# Patient Record
Sex: FEMALE | Race: WHITE | Employment: UNEMPLOYED | URBAN - NONMETROPOLITAN AREA
[De-identification: names, ages, dates, MRNs, and addresses within clinical notes are randomized per-mention and may not be internally consistent; named-entity substitution may affect disease eponyms.]

---

## 2019-08-07 ENCOUNTER — OFFICE VISIT (OUTPATIENT)
Dept: ORTHOPEDIC SURGERY | Age: 59
End: 2019-08-07
Payer: MEDICAID

## 2019-08-07 VITALS — HEIGHT: 63 IN | WEIGHT: 191 LBS | BODY MASS INDEX: 33.84 KG/M2

## 2019-08-07 DIAGNOSIS — M23.91 INTERNAL DERANGEMENT OF BOTH KNEES: Primary | ICD-10-CM

## 2019-08-07 DIAGNOSIS — M23.92 INTERNAL DERANGEMENT OF BOTH KNEES: Primary | ICD-10-CM

## 2019-08-07 DIAGNOSIS — M17.0 PRIMARY OSTEOARTHRITIS OF BOTH KNEES: ICD-10-CM

## 2019-08-07 PROCEDURE — G8427 DOCREV CUR MEDS BY ELIG CLIN: HCPCS | Performed by: NURSE PRACTITIONER

## 2019-08-07 PROCEDURE — 4004F PT TOBACCO SCREEN RCVD TLK: CPT | Performed by: NURSE PRACTITIONER

## 2019-08-07 PROCEDURE — G8417 CALC BMI ABV UP PARAM F/U: HCPCS | Performed by: NURSE PRACTITIONER

## 2019-08-07 PROCEDURE — 3017F COLORECTAL CA SCREEN DOC REV: CPT | Performed by: NURSE PRACTITIONER

## 2019-08-07 PROCEDURE — 99203 OFFICE O/P NEW LOW 30 MIN: CPT | Performed by: NURSE PRACTITIONER

## 2019-08-07 RX ORDER — CLONIDINE HYDROCHLORIDE 0.1 MG/1
0.1 TABLET ORAL
COMMUNITY
Start: 2019-08-05

## 2019-08-07 NOTE — LETTER
motion  -there is not an effusion  - there is tenderness over the  medial, lateral region  -there are not any masses  - there is not ligamentous instability  -there is not  deformity noted. - tenderness laxity is not noted with varus and valgus stress.   -[place] testing pain to lateral aspect  -Anterior/posterior drawer testing negative    left knee exam shows;    -range of motion of L. Knee is 0 to 120. The patient does have  pain on motion  -there is not an effusion  - there is tenderness over the  medial region  -there are not any masses  - there is not ligamentous instability  -there is not  deformity noted. - tenderness laxity is not noted with varus or valgus stress.   -[place]rays testing pain to medial aspect  -Anterior/posterior drawer testing negative    Contralateral Exam:  -No obvious deformities  -No abrasions or cellulitis noted, NVI   -Full ROM   -No joint laxity  -no palpable tenderness noted    Neurological exam:   -Deep tendon reflexes are 2/4 at the knees and 2/4 at the ankles with strong extensor hallicus longus motor strength bilaterally. --Distal pulses DP/PT: R. 2+; L. 2+.     Skin exam:  There is not any cellulitis, lymphedema or cutaneous lesions noted in the lower extremities.     Xray: 3 view of right knee obtained on 7/19/2019 and reviewed today shows: No acute fractures or deformities; lateral compartment well-maintained; medial compartment difficult to view as well as I would like; degenerative joint space narrowing of the patellofemoral joint  5 view of left knee obtained today shows: No acute fractures or deformity; lateral compartment well-maintained, mild joint space narrowing of the medial compartment and patellofemoral joint    Assessment: Bilateral knee internal derangement; bilateral knee osteoarthritis    Plan:  I discussed treatment options with patient including cortisone injections, anti-inflammatories, therapy and rest.  Patient prefers to avoid any

## 2019-09-25 ENCOUNTER — OFFICE VISIT (OUTPATIENT)
Dept: ORTHOPEDIC SURGERY | Age: 59
End: 2019-09-25
Payer: MEDICAID

## 2019-09-25 VITALS
DIASTOLIC BLOOD PRESSURE: 76 MMHG | SYSTOLIC BLOOD PRESSURE: 141 MMHG | HEART RATE: 99 BPM | BODY MASS INDEX: 31.89 KG/M2 | HEIGHT: 63 IN | WEIGHT: 180 LBS

## 2019-09-25 DIAGNOSIS — M17.11 PRIMARY OSTEOARTHRITIS OF RIGHT KNEE: Primary | ICD-10-CM

## 2019-09-25 DIAGNOSIS — M23.91 INTERNAL DERANGEMENT OF RIGHT KNEE: ICD-10-CM

## 2019-09-25 PROCEDURE — 20610 DRAIN/INJ JOINT/BURSA W/O US: CPT | Performed by: NURSE PRACTITIONER

## 2019-09-25 RX ORDER — AMOXICILLIN 250 MG/1
250 CAPSULE ORAL 3 TIMES DAILY
COMMUNITY
End: 2020-05-13 | Stop reason: ALTCHOICE

## 2019-09-25 NOTE — LETTER
[facility] [place] [address]  Phone: [phone]  Fax: [phone]    KEERTHI Cardenas CNP        September 25, 2019    [facility]  [address]  [facility] [address]    Patient presents today for a right knee cortisone injection. Patient had requested one previously but stated she did not have insulin at home. The patient now confirms she does have insulin if needed for an increase in blood sugar after the cortisone injection. Recommendation is for a cortisone injection into the right knee. After informed consent was received from the patient, the right knee was injected with 1 mL( 40mg) Depo-Medrol and 4 mL  of 0.25% Marcaine in the syringe from an anterolateral joint line approach, using a 25-gauge needle, under sterile Betadine prep, using ethyl chloride as a topical refrigerant, for a diagnosis of osteoarthritis. The patient appeared to tolerate it well. The patient should return here periodically as needed.     Site: RIGHT KNEE    BUPIVACAINE  Lot number: [id number]  NDC#  49443-008-06      DEPO  NDC# 6721-6282-00  Lot number: [id number]    Product:  BUPIVACAINE/DEPO    All medications were supplied by the facility            Sincerely,  '  KEERTHI Cardenas CNP

## 2020-05-13 ENCOUNTER — VIRTUAL VISIT (OUTPATIENT)
Dept: ENDOCRINOLOGY | Age: 60
End: 2020-05-13
Payer: MEDICAID

## 2020-05-13 PROCEDURE — 99243 OFF/OP CNSLTJ NEW/EST LOW 30: CPT | Performed by: INTERNAL MEDICINE

## 2020-05-13 PROCEDURE — 2022F DILAT RTA XM EVC RTNOPTHY: CPT | Performed by: INTERNAL MEDICINE

## 2020-05-13 PROCEDURE — G8427 DOCREV CUR MEDS BY ELIG CLIN: HCPCS | Performed by: INTERNAL MEDICINE

## 2020-05-13 RX ORDER — FLASH GLUCOSE SENSOR
KIT MISCELLANEOUS
Qty: 2 EACH | Refills: 3 | Status: SHIPPED | OUTPATIENT
Start: 2020-05-13 | End: 2021-06-17 | Stop reason: SDUPTHER

## 2020-05-13 RX ORDER — OXYBUTYNIN CHLORIDE 10 MG/1
10 TABLET, EXTENDED RELEASE ORAL DAILY
COMMUNITY

## 2020-05-13 RX ORDER — HYDROCODONE BITARTRATE AND ACETAMINOPHEN 5; 325 MG/1; MG/1
1 TABLET ORAL 2 TIMES DAILY
COMMUNITY
Start: 2013-12-17

## 2020-05-13 RX ORDER — FLASH GLUCOSE SCANNING READER
EACH MISCELLANEOUS
Qty: 1 DEVICE | Refills: 0 | Status: SHIPPED | OUTPATIENT
Start: 2020-05-13 | End: 2021-06-17 | Stop reason: SDUPTHER

## 2020-05-13 RX ORDER — FLUTICASONE PROPIONATE 110 UG/1
1 AEROSOL, METERED RESPIRATORY (INHALATION) 2 TIMES DAILY
COMMUNITY

## 2020-05-13 RX ORDER — INSULIN ASPART 100 [IU]/ML
3-4 INJECTION, SOLUTION INTRAVENOUS; SUBCUTANEOUS
Qty: 5 PEN | Refills: 3 | Status: SHIPPED | OUTPATIENT
Start: 2020-05-13 | End: 2021-06-17 | Stop reason: SDUPTHER

## 2020-05-13 ASSESSMENT — ENCOUNTER SYMPTOMS
VOMITING: 0
BACK PAIN: 0
ABDOMINAL PAIN: 0
COUGH: 0
SHORTNESS OF BREATH: 0
NAUSEA: 0
SORE THROAT: 0

## 2020-05-13 NOTE — PROGRESS NOTES
 pravastatin (PRAVACHOL) 40 MG tablet Take 40 mg by mouth daily.  ranolazine (RANEXA) 1000 MG TB12 Take  by mouth. There were no vitals filed for this visit. There is no height or weight on file to calculate BMI. Wt Readings from Last 3 Encounters:   19 180 lb (81.6 kg)   19 191 lb (86.6 kg)   08/27/15 179 lb (81.2 kg)     BP Readings from Last 3 Encounters:   19 (!) 141/76   08/27/15 118/70   14 136/78        Past Medical History:   Diagnosis Date    Asthma     CAD (coronary artery disease)     Diabetes mellitus ([facility]     Postmenopausal symptoms      Past Surgical History:   Procedure Laterality Date     SECTION      DILATION AND CURETTAGE OF UTERUS      TONSILLECTOMY       History reviewed. No pertinent family history. Social History     Tobacco Use   Smoking Status Current Every Day Smoker    Packs/day: 0.30    Years: 20.00    Pack years: 6.00    Types: Cigarettes   Smokeless Tobacco Never Used      Social History     Substance and Sexual Activity   Alcohol Use Yes       HPI      Clive Colby is a 64 y.o. female who  was seen in a virtual visit for initial evaluation of  DM. Patient is being seen at the request of Velma Li MD      Due to the COVID-19 restrictions on close contact interactions the patient's visit was conducted via video link  ( doxy. me) in lieu of a face to face visit. Location for patient : home  Physician : home    Pursuant to the emergency declaration under the [address] [place] [address] and the [employer] Act, this Virtual  Visit was conducted, with patient's consent, to reduce the patient's risk of exposure to COVID-19 and provide necessary care. Because this was a Virtual Visit, evaluation of the following organ systems was limited: Vitals, Constitutional, EENT, Resp, CV, GI, , MS, Neuro, Skin. Heme. Lymph, Imm. Patient has a PMH of Type 2 DM, hypertension, hyperlipidemia, CKD    Diagnosed with Diabetes Mellitus type 2 > 20 yrs    Course has been variable . Microvascular complications: No known retinopathy Last eye exam 02/20. Has Nephropathy. Has Peripheral neuropathy. Home regimen:   Novolin 70/30 on a sliding scale twice a day. Previous medications: Metformin     Blood glucose trend  Checks BS 4 times a day    -200      Diet: Eats  meals/day     Nutrition education: No   Exercise: No      Hyperlipidemia:  Pravastatin 40 mg daily. Hypothyroidism : Was diagnosed with hypothyroidism in 1768-5960. On levothyroxine 100 mcg daily. Labs    Reviewed from [place] [address]: Negative for sore throat. Respiratory: Negative for cough and shortness of breath. Cardiovascular: Negative for chest pain and palpitations. Gastrointestinal: Negative for abdominal pain, nausea and vomiting. Endocrine: Negative for polydipsia. Genitourinary: Negative for frequency and urgency. Musculoskeletal: Negative for back pain and myalgias. Skin: Negative for rash. Neurological: Negative for headaches. Psychiatric/Behavioral: The patient is not nervous/anxious. Brief exam on video visit  Patient alert,  Awake, oriented. Normal speech  No distress noted  Normal eyes   Normal hearing  Normal  hand movements. Assessment/Plan      1. Type 2 DM     Aliyah Henriquez is a 64 y.o. female has Type 2 DM with obesity and insulin resistance. B2W 5.4 %     Complicated by nephropathy, neuropathy. She has made in her changes. Requiring very few units of insulin. Advised lifestyle modification including carb restricted diet . Refer to lidia for a diabetic meal plan at next visit. Advised follow-up with the ophthalmologist once year. Discussed foot care. 2. Hypertension. As per nephrology . 3. Hyperlipidemia. On statins.

## 2020-05-14 ENCOUNTER — TELEPHONE (OUTPATIENT)
Dept: ENDOCRINOLOGY | Age: 60
End: 2020-05-14

## 2020-05-14 NOTE — TELEPHONE ENCOUNTER
----- Message from Slime Harper MD sent at 5/13/2020  1:55 PM EDT -----  Please send a copy of my note to patients referring physician Carla Del Castillo MD  and schedule a follow-up in 3 month at [facility].

## 2020-05-28 ENCOUNTER — TELEPHONE (OUTPATIENT)
Dept: ENDOCRINOLOGY | Age: 60
End: 2020-05-28

## 2020-05-28 NOTE — TELEPHONE ENCOUNTER
I have only seen the patient once and she was already using a sliding scale . She can take 2 units if sugars above 150 and 4 units if above 200  No insulin if sugar below 150.

## 2020-05-28 NOTE — TELEPHONE ENCOUNTER
Pt would like someone to call her with directions for her insuline pen, she is needing to how much to take

## 2020-10-12 ENCOUNTER — VIRTUAL VISIT (OUTPATIENT)
Dept: ENDOCRINOLOGY | Age: 60
End: 2020-10-12
Payer: MEDICAID

## 2020-10-12 PROCEDURE — 3046F HEMOGLOBIN A1C LEVEL >9.0%: CPT | Performed by: INTERNAL MEDICINE

## 2020-10-12 PROCEDURE — 99214 OFFICE O/P EST MOD 30 MIN: CPT | Performed by: INTERNAL MEDICINE

## 2020-10-12 PROCEDURE — 2022F DILAT RTA XM EVC RTNOPTHY: CPT | Performed by: INTERNAL MEDICINE

## 2020-10-12 PROCEDURE — 3017F COLORECTAL CA SCREEN DOC REV: CPT | Performed by: INTERNAL MEDICINE

## 2020-10-12 PROCEDURE — G8427 DOCREV CUR MEDS BY ELIG CLIN: HCPCS | Performed by: INTERNAL MEDICINE

## 2020-10-12 ASSESSMENT — ENCOUNTER SYMPTOMS
COUGH: 0
VOMITING: 0
BACK PAIN: 0
SHORTNESS OF BREATH: 0
ABDOMINAL PAIN: 0
SORE THROAT: 0
NAUSEA: 0

## 2020-10-12 NOTE — PROGRESS NOTES
Endocrinology  Braulio Smith M.D. Phone: [phone]   FAX: [phone]       Loletha Babinski   YOB: 1960    Date of Visit:  10/12/2020    Allergies   Allergen Reactions    Ace Inhibitors     Bee Venom     Milk-Related Compounds      Outpatient Medications Marked as Taking for the 10/12/20 encounter (Virtual Visit) with Sugar Hernandez MD   Medication Sig Dispense Refill    HYDROcodone-acetaminophen (NORCO) 5-325 MG per tablet Take 1 tablet by mouth 2 times daily.  fluticasone (FLOVENT HFA) 110 MCG/ACT inhaler Inhale 1 puff into the lungs 2 times daily      oxybutynin (DITROPAN-XL) 10 MG extended release tablet Take 10 mg by mouth daily      insulin aspart (NOVOLOG FLEXPEN) 100 UNIT/ML injection pen Inject 3-4 Units into the skin 3 times daily (before meals) 5 pen 3    Insulin Pen Needle 32G X 4 MM MISC 1 each by Does not apply route 3 times daily 100 each 3    Continuous Blood Gluc  (FREESTYLE SARATH 14 DAY READER) ARCADIO Use to monitor sugars 1 Device 0    Continuous Blood Gluc Sensor (FREESTYLE SARATH 14 DAY SENSOR) MISC Use to monitor sugars 2 each 3    cloNIDine (CATAPRES) 0.1 MG tablet Take 0.1 mg by mouth      estrogens, conjugated,-methyltestosterone (EST ESTROGENS-METHYLTEST HS) 0.625-1.25 MG per tablet TAKE ONE TABLET BY MOUTH ONCE DAILY 30 tablet 11    FEMHRT 1/5 1-5 MG-MCG TABS Take 1 tablet by mouth daily. 30 tablet 11    potassium chloride (KLOR-CON) 10 MEQ CR tablet Take 10 mEq by mouth as needed.  albuterol (PROVENTIL) (2.5 MG/3ML) 0.083% nebulizer solution Take 2.5 mg by nebulization as needed.  levothyroxine (SYNTHROID) 100 MCG tablet Take 100 mcg by mouth daily.  amlodipine (NORVASC) 10 MG tablet Take 10 mg by mouth daily.  theophylline (RICKY-24) 400 MG CP24 Take  by mouth 2 times daily.  paroxetine (PAXIL) 30 MG tablet Take 60 mg by mouth every morning.       alprazolam (XANAX) 1 MG tablet Take 1 mg by mouth 4 times daily.  valsartan-hydrochlorothiazide (DIOVAN-HCT) 160-25 MG per tablet Take 1 Tab by mouth daily.  omeprazole (PRILOSEC) 40 MG capsule Take 40 mg by mouth daily.  pravastatin (PRAVACHOL) 40 MG tablet Take 40 mg by mouth daily.  ranolazine (RANEXA) 1000 MG TB12 Take  by mouth. There were no vitals filed for this visit. There is no height or weight on file to calculate BMI. Wt Readings from Last 3 Encounters:   19 180 lb (81.6 kg)   19 191 lb (86.6 kg)   08/27/15 179 lb (81.2 kg)     BP Readings from Last 3 Encounters:   19 (!) 141/76   08/27/15 118/70   14 136/78        Past Medical History:   Diagnosis Date    Asthma     CAD (coronary artery disease)     Diabetes mellitus ([address]     Postmenopausal symptoms      Past Surgical History:   Procedure Laterality Date     SECTION      DILATION AND CURETTAGE OF UTERUS      TONSILLECTOMY       History reviewed. No pertinent family history. Social History     Tobacco Use   Smoking Status Current Every Day Smoker    Packs/day: 0.30    Years: 20.00    Pack years: 6.00    Types: Cigarettes   Smokeless Tobacco Never Used      Social History     Substance and Sexual Activity   Alcohol Use Yes       HPI      Felicitas Coughlin is a 64 y.o. female who  was seen in a virtual visit for management of  DM. PCP  Anthony Abraham MD      Due to the COVID-19 restrictions on close contact interactions the patient's visit was conducted via video link  ( doxy. me) in lieu of a face to face visit. Location for patient : home  Physician : home    Pursuant to the emergency declaration under the [facility], [address] [place] [address] authority and the [employer] Act, this Virtual  Visit was conducted, with patient's consent, to reduce the patient's risk of exposure to COVID-19 and provide necessary care.       Because this was a Virtual Visit, evaluation of the following organ systems was limited: Vitals, Constitutional, EENT, Resp, CV, GI, , MS, Neuro, Skin. Heme. Lymph, Imm. Patient has a PMH of Type 2 DM, hypertension, hyperlipidemia, CKD    Diagnosed with Diabetes Mellitus type 2 > 20 yrs    Course has been variable . Microvascular complications: No known retinopathy Last eye exam 02/20. Has Nephropathy. Has Peripheral neuropathy. Home regimen:   Novolog on a sliding scale twice a day. -200 2 units   201-250 4 units  > 250  6 units    Previous medications: Metformin     Blood glucose trend  Checks BS 4 times a day    -150      Diet: Eats  meals/day     Nutrition education: No   Exercise: No      Hyperlipidemia:  She has mixed hyperlipidemia  O nPravastatin 40 mg daily. TOLERATING WELL. No muscle aches or pain    Hypothyroidism : Was diagnosed with hypothyroidism in 3174-3312. On levothyroxine 100 mcg daily. Labs    Reviewed from [place] [address]: Negative for sore throat. Respiratory: Negative for cough and shortness of breath. Cardiovascular: Negative for chest pain and palpitations. Gastrointestinal: Negative for abdominal pain, nausea and vomiting. Endocrine: Negative for polydipsia. Genitourinary: Negative for frequency and urgency. Musculoskeletal: Negative for back pain and myalgias. Skin: Negative for rash. Neurological: Negative for headaches. Psychiatric/Behavioral: The patient is not nervous/anxious. Brief exam on video visit  Patient alert,  Awake, oriented. Normal speech  No distress noted  Normal eyes   Normal hearing  Normal  hand movements. Assessment/Plan      1. Type 2 DM     Aliyah Traore is a 64 y.o. female has Type 2 DM with obesity and insulin resistance. S5N 5.4 %     Complicated by nephropathy, neuropathy.      She has made in her diet  Insulin requirements have decreased     Continue novolog SSI

## 2021-01-06 NOTE — TELEPHONE ENCOUNTER
Medication:   Requested Prescriptions     Pending Prescriptions Disp Refills    Insulin Pen Needle 32G X 4 MM MISC 100 each 3     Si each by Does not apply route 3 times daily       Last appt: 2020   Next appt: 2021    Last Labs DM: No results found for: LABA1C

## 2021-03-11 ENCOUNTER — VIRTUAL VISIT (OUTPATIENT)
Dept: ENDOCRINOLOGY | Age: 61
End: 2021-03-11
Payer: MEDICAID

## 2021-03-11 DIAGNOSIS — E88.81 INSULIN RESISTANCE: ICD-10-CM

## 2021-03-11 DIAGNOSIS — E66.9 CLASS 1 OBESITY WITH SERIOUS COMORBIDITY AND BODY MASS INDEX (BMI) OF 31.0 TO 31.9 IN ADULT, UNSPECIFIED OBESITY TYPE: ICD-10-CM

## 2021-03-11 PROCEDURE — 4004F PT TOBACCO SCREEN RCVD TLK: CPT | Performed by: NURSE PRACTITIONER

## 2021-03-11 PROCEDURE — G8428 CUR MEDS NOT DOCUMENT: HCPCS | Performed by: NURSE PRACTITIONER

## 2021-03-11 PROCEDURE — G8484 FLU IMMUNIZE NO ADMIN: HCPCS | Performed by: NURSE PRACTITIONER

## 2021-03-11 PROCEDURE — 99213 OFFICE O/P EST LOW 20 MIN: CPT | Performed by: NURSE PRACTITIONER

## 2021-03-11 PROCEDURE — G8421 BMI NOT CALCULATED: HCPCS | Performed by: NURSE PRACTITIONER

## 2021-03-11 PROCEDURE — 3017F COLORECTAL CA SCREEN DOC REV: CPT | Performed by: NURSE PRACTITIONER

## 2021-03-11 ASSESSMENT — ENCOUNTER SYMPTOMS
SORE THROAT: 0
COUGH: 0
NAUSEA: 0
VOMITING: 0
SHORTNESS OF BREATH: 0
ABDOMINAL PAIN: 0
BACK PAIN: 0

## 2021-03-11 NOTE — PROGRESS NOTES
Endocrinology  Rohan Delaney CNP, [address] [facility], [address]  Phone [phone]  Fax [phone]    Luis Enrique Russo is  being evaluated by a Virtual Visit (video visit) encounter to address concerns as mentioned above. Due to this being a TeleHealth encounter (During DPHEQ-67 public health emergency), evaluation of the following organ systems was limited: Vitals/Constitutional/EENT/Resp/CV/GI//MS/Neuro/Skin/Heme-Lymph-Imm. Pursuant to the emergency declaration under the [address] [place] [address], [address] [place] [address] authority and the [employer] Act, this Virtual Visit was conducted with patient's (and/or legal guardian's) consent, to reduce the patient's risk of exposure to COVID-19 and provide necessary medical care. The patient (and/or legal guardian) has also been advised to contact this office for worsening conditions or problems, and seek emergency medical treatment and/or call 911 if deemed necessary. Services were provided through a video synchronous discussion virtually to substitute for in-person clinic visit. Patient and provider were located at their individual homes    Patient was identified via name,  on the video visit    Luis Enrique Russo is a 63 y.o. female who  was seen in a virtual visit for management of  DM. PCP  Wesley Corado MD        Allergies   Allergen Reactions    Latex      Other reaction(s): Other (See Comments)    Ace Inhibitors     Bee Venom     Levofloxacin      Other reaction(s):  Other (See Comments)    Methylprednisolone Other (See Comments)    Milk-Related Compounds     Prednisone Other (See Comments)     Outpatient Medications Marked as Taking for the 3/11/21 encounter (Virtual Visit) with KEERTHI Pennington CNP   Medication Sig Dispense Refill    Insulin Pen Needle 32G X 4 MM MISC USE WITH INSULIN 3 TIMES DAILY 100 each 3    HYDROcodone-acetaminophen (NORCO) 5-325 MG per tablet Take 1 tablet by mouth 2 times daily.  fluticasone (FLOVENT HFA) 110 MCG/ACT inhaler Inhale 1 puff into the lungs 2 times daily      oxybutynin (DITROPAN-XL) 10 MG extended release tablet Take 10 mg by mouth daily      insulin aspart (NOVOLOG FLEXPEN) 100 UNIT/ML injection pen Inject 3-4 Units into the skin 3 times daily (before meals) 5 pen 3    Continuous Blood Gluc  (FREESTYLE SARATH 14 DAY READER) ARCADIO Use to monitor sugars 1 Device 0    Continuous Blood Gluc Sensor (FREESTYLE SARATH 14 DAY SENSOR) [facility] to [facility] sugars 2 each 3    cloNIDine (CATAPRES) 0.1 MG tablet Take 0.1 mg by mouth      estrogens, conjugated,-methyltestosterone (EST ESTROGENS-METHYLTEST HS) 0.625-1.25 MG per tablet TAKE ONE TABLET BY MOUTH ONCE DAILY 30 tablet 11    FEMHRT 1/5 1-5 MG-MCG TABS Take 1 tablet by mouth daily. 30 tablet 11    potassium chloride (KLOR-CON) 10 MEQ CR tablet Take 10 mEq by mouth as needed.  albuterol (PROVENTIL) (2.5 MG/3ML) 0.083% nebulizer solution Take 2.5 mg by nebulization as needed.  levothyroxine (SYNTHROID) 100 MCG tablet Take 100 mcg by mouth daily.  amlodipine (NORVASC) 10 MG tablet Take 10 mg by mouth daily.  theophylline (RICKY-24) 400 MG CP24 Take  by mouth 2 times daily.  paroxetine (PAXIL) 30 MG tablet Take 60 mg by mouth every morning.  alprazolam (XANAX) 1 MG tablet Take 1 mg by mouth 4 times daily.  valsartan-hydrochlorothiazide (DIOVAN-HCT) 160-25 MG per tablet Take 1 Tab by mouth daily.  omeprazole (PRILOSEC) 40 MG capsule Take 40 mg by mouth daily.  pravastatin (PRAVACHOL) 40 MG tablet Take 40 mg by mouth daily.  ranolazine (RANEXA) 1000 MG TB12 Take  by mouth. There were no vitals filed for this visit. There is no height or weight on file to calculate BMI.      Wt Readings from Last 3 Encounters:   09/25/19 180 lb (81.6 kg)   08/07/19 191 lb (86.6 kg) spent directly counseling patient about topics listed above (such as lifestyle modifications, preventative screenings and/or disease related processes. Return in about 6 months (around 9/11/2021).

## 2021-03-23 PROBLEM — E66.9 OBESITY: Status: ACTIVE | Noted: 2021-03-23

## 2021-03-23 PROBLEM — E88.819 INSULIN RESISTANCE: Status: ACTIVE | Noted: 2021-03-23

## 2021-03-23 PROBLEM — E88.81 INSULIN RESISTANCE: Status: ACTIVE | Noted: 2021-03-23

## 2021-06-17 ENCOUNTER — TELEPHONE (OUTPATIENT)
Dept: ENDOCRINOLOGY | Age: 61
End: 2021-06-17

## 2021-06-17 ENCOUNTER — VIRTUAL VISIT (OUTPATIENT)
Dept: ENDOCRINOLOGY | Age: 61
End: 2021-06-17
Payer: MEDICAID

## 2021-06-17 DIAGNOSIS — Z79.4 TYPE 2 DIABETES MELLITUS WITHOUT COMPLICATION, WITH LONG-TERM CURRENT USE OF INSULIN (HCC): Primary | ICD-10-CM

## 2021-06-17 DIAGNOSIS — E11.9 TYPE 2 DIABETES MELLITUS WITHOUT COMPLICATION, WITH LONG-TERM CURRENT USE OF INSULIN (HCC): Primary | ICD-10-CM

## 2021-06-17 DIAGNOSIS — E66.9 CLASS 1 OBESITY WITH SERIOUS COMORBIDITY AND BODY MASS INDEX (BMI) OF 31.0 TO 31.9 IN ADULT, UNSPECIFIED OBESITY TYPE: ICD-10-CM

## 2021-06-17 PROCEDURE — 99213 OFFICE O/P EST LOW 20 MIN: CPT | Performed by: NURSE PRACTITIONER

## 2021-06-17 PROCEDURE — G8421 BMI NOT CALCULATED: HCPCS | Performed by: NURSE PRACTITIONER

## 2021-06-17 PROCEDURE — 4004F PT TOBACCO SCREEN RCVD TLK: CPT | Performed by: NURSE PRACTITIONER

## 2021-06-17 PROCEDURE — 3046F HEMOGLOBIN A1C LEVEL >9.0%: CPT | Performed by: NURSE PRACTITIONER

## 2021-06-17 PROCEDURE — G8427 DOCREV CUR MEDS BY ELIG CLIN: HCPCS | Performed by: NURSE PRACTITIONER

## 2021-06-17 PROCEDURE — 3017F COLORECTAL CA SCREEN DOC REV: CPT | Performed by: NURSE PRACTITIONER

## 2021-06-17 PROCEDURE — 2022F DILAT RTA XM EVC RTNOPTHY: CPT | Performed by: NURSE PRACTITIONER

## 2021-06-17 RX ORDER — THEOPHYLLINE ANHYDROUS 200 MG/1
CAPSULE, EXTENDED RELEASE ORAL
COMMUNITY
Start: 2021-05-06

## 2021-06-17 RX ORDER — LOSARTAN POTASSIUM 25 MG/1
TABLET ORAL
COMMUNITY
Start: 2021-06-16

## 2021-06-17 RX ORDER — FLASH GLUCOSE SCANNING READER
EACH MISCELLANEOUS
Qty: 1 DEVICE | Refills: 0 | Status: SHIPPED | OUTPATIENT
Start: 2021-06-17 | End: 2021-06-21 | Stop reason: SDUPTHER

## 2021-06-17 RX ORDER — ISOSORBIDE MONONITRATE 30 MG/1
TABLET, EXTENDED RELEASE ORAL
COMMUNITY
Start: 2021-05-26

## 2021-06-17 RX ORDER — SIMVASTATIN 40 MG
TABLET ORAL
COMMUNITY
Start: 2021-05-06

## 2021-06-17 RX ORDER — INSULIN ASPART 100 [IU]/ML
3-4 INJECTION, SOLUTION INTRAVENOUS; SUBCUTANEOUS
Qty: 5 PEN | Refills: 3 | Status: SHIPPED | OUTPATIENT
Start: 2021-06-17 | End: 2022-03-11 | Stop reason: SDUPTHER

## 2021-06-17 RX ORDER — FLASH GLUCOSE SENSOR
KIT MISCELLANEOUS
Qty: 2 EACH | Refills: 3 | Status: SHIPPED | OUTPATIENT
Start: 2021-06-17 | End: 2021-06-21 | Stop reason: SDUPTHER

## 2021-06-17 RX ORDER — GLUCOSAMINE HCL/CHONDROITIN SU 500-400 MG
1 CAPSULE ORAL
Qty: 150 EACH | Refills: 5 | Status: SHIPPED | OUTPATIENT
Start: 2021-06-17

## 2021-06-17 RX ORDER — ALCOHOL ANTISEPTIC PADS
PADS, MEDICATED (EA) TOPICAL
COMMUNITY
Start: 2021-05-06

## 2021-06-17 ASSESSMENT — ENCOUNTER SYMPTOMS
ABDOMINAL PAIN: 0
BACK PAIN: 0
VOMITING: 0
NAUSEA: 0
COUGH: 0
SORE THROAT: 0
SHORTNESS OF BREATH: 0

## 2021-06-17 NOTE — PROGRESS NOTES
Endocrinology  Thedford Dubin, CNP, [address] [facility] [address]   [address] [facility], [address]  Phone [phone]  Fax [phone]    Efrain Jenkins is  being evaluated by a Virtual Visit (phone visit) encounter to address concerns as mentioned above. Due to this being a TeleHealth encounter (During UPAIL-14 public health emergency), evaluation of the following organ systems was limited: Vitals/Constitutional/EENT/Resp/CV/GI//MS/Neuro/Skin/Heme-Lymph-Imm. Pursuant to the emergency declaration under the [facility], [address] [place] [address] authority and the [employer] Act, this Virtual Visit was conducted with patient's (and/or legal guardian's) consent, to reduce the patient's risk of exposure to COVID-19 and provide necessary medical care. The patient (and/or legal guardian) has also been advised to contact this office for worsening conditions or problems, and seek emergency medical treatment and/or call 911 if deemed necessary. Services were provided through a phone synchronous discussion virtually to substitute for in-person clinic visit. Patient and provider were located at their individual homes    Patient was identified via name,  on the phone visit      Efrain Jenkins is a 63 y.o. female who  was seen in a virtual visit for management of  DM. PCP  Femrin Corado MD        Allergies   Allergen Reactions    Latex      Other reaction(s): Other (See Comments)    Ace Inhibitors     Bee Venom     Levofloxacin      Other reaction(s):  Other (See Comments)    Methylprednisolone Other (See Comments)    Milk-Related Compounds     Prednisone Other (See Comments)     Outpatient Medications Marked as Taking for the 21 encounter (Virtual Visit) with Thedford Dubin, APRN - CNP   Medication Sig Dispense Refill    isosorbide mononitrate (IMDUR) 30 MG extended release tablet TAKE ONE TABLET BY MOUTH EVERY MORNING      losartan (COZAAR) 25 MG tablet       simvastatin (ZOCOR) 40 MG tablet TAKE ONE TABLET BY MOUTH EVERY DAY      RICKY-24 200 MG extended release capsule TAKE ONE CAPSULE BY MOUTH TWO TIMES A DAY      UltiCare Alcohol Swabs 70 % PADS USE TWO TIMES A DAY TO TEST BLOOD SUGAR.  Continuous Blood Gluc  (FREESTYLE SARATH 14 DAY READER) ARCADIO Use to monitor sugars 1 Device 0    Continuous Blood Gluc Sensor (FREESTYLE SARATH 14 DAY SENSOR) MISC Use to monitor sugars 2 each 3    Insulin Pen Needle 32G X 4 MM MISC USE WITH INSULIN 3 TIMES DAILY 100 each 3    insulin aspart (NOVOLOG FLEXPEN) 100 UNIT/ML injection pen Inject 3-4 Units into the skin 3 times daily (before meals) 5 pen 3    Alcohol Swabs 70 % PADS 1 Units by Does not apply route 5 times daily 150 each 5    HYDROcodone-acetaminophen (NORCO) 5-325 MG per tablet Take 1 tablet by mouth 2 times daily.  fluticasone (FLOVENT HFA) 110 MCG/ACT inhaler Inhale 1 puff into the lungs 2 times daily      oxybutynin (DITROPAN-XL) 10 MG extended release tablet Take 10 mg by mouth daily      cloNIDine (CATAPRES) 0.1 MG tablet Take 0.1 mg by mouth      estrogens, conjugated,-methyltestosterone (EST ESTROGENS-METHYLTEST HS) 0.625-1.25 MG per tablet TAKE ONE TABLET BY MOUTH ONCE DAILY 30 tablet 11    FEMHRT 1/5 1-5 MG-MCG TABS Take 1 tablet by mouth daily. 30 tablet 11    potassium chloride (KLOR-CON) 10 MEQ CR tablet Take 10 mEq by mouth as needed.  albuterol (PROVENTIL) (2.5 MG/3ML) 0.083% nebulizer solution Take 2.5 mg by nebulization as needed.  levothyroxine (SYNTHROID) 100 MCG tablet Take 100 mcg by mouth daily.  amlodipine (NORVASC) 10 MG tablet Take 10 mg by mouth daily.  theophylline (RICKY-24) 400 MG CP24 Take  by mouth 2 times daily.  paroxetine (PAXIL) 30 MG tablet Take 60 mg by mouth every morning.  alprazolam (XANAX) 1 MG tablet Take 1 mg by mouth 4 times daily.       valsartan-hydrochlorothiazide (DIOVAN-HCT) 160-25 MG per tablet Take 1 Tab by mouth daily.  omeprazole (PRILOSEC) 40 MG capsule Take 40 mg by mouth daily.  pravastatin (PRAVACHOL) 40 MG tablet Take 40 mg by mouth daily.  ranolazine (RANEXA) 1000 MG TB12 Take  by mouth. There were no vitals filed for this visit. There is no height or weight on file to calculate BMI. Wt Readings from Last 3 Encounters:   19 180 lb (81.6 kg)   19 191 lb (86.6 kg)   08/27/15 179 lb (81.2 kg)     BP Readings from Last 3 Encounters:   19 (!) 141/76   08/27/15 118/70   14 136/78        Past Medical History:   Diagnosis Date    Asthma     CAD (coronary artery disease)     Diabetes mellitus ([facility] [address]     Postmenopausal symptoms      Past Surgical History:   Procedure Laterality Date     SECTION      DILATION AND CURETTAGE OF UTERUS      TONSILLECTOMY       History reviewed. No pertinent family history. Social History     Tobacco Use   Smoking Status Current Every Day Smoker    Packs/day: 0.30    Years: 20.00    Pack years: 6.00    Types: Cigarettes   Smokeless Tobacco Never Used      Social History     Substance and Sexual Activity   Alcohol Use Yes         Diagnosed with Diabetes Mellitus type 2 > 20 yrs  Course has been variable . On insulin? > 5 years  Microvascular complications:   · No known retinopathy Last eye exam . · Has Nephropathy. · Has Peripheral neuropathy. Macrovascular history : none    Blood glucose trend  Checks BS 4 times a day  BS FB-150  Prandial: 135 -170    Home medication regimen:     Novolog on a sliding scale twice a day. BS    151-200  2 units   201-250  4 units  > 250    6 units     Previous medications: Metformin     Diabetic dietary regimen  Diet: Eats  2-3 meals/day   Carb counting: none  Nutrition education: No   Exercise: No    Hyperlipidemia:    OnPravastatin 40 mg daily.    Tolerates well, no muscle aches or pain      Hypothyroidism :   Was diagnosed with hypothyroidism in 5370-8346. On levothyroxine 100 mcg daily. No results found for: TSH, T3LOOWO, P0NHHDO, THYROIDAB, FT3, T4FREE  Reviewed labs from [facility]: Negative for sore throat. Respiratory: Negative for cough and shortness of breath. Cardiovascular: Negative for chest pain and palpitations. Gastrointestinal: Negative for abdominal pain, nausea and vomiting. Endocrine: Negative for polydipsia. Genitourinary: Negative for frequency and urgency. Musculoskeletal: Negative for back pain and myalgias. Skin: Negative for rash. Neurological: Negative for headaches. Psychiatric/Behavioral: The patient is not nervous/anxious. Brief exam on video visit  Patient alert,  Awake, oriented. Normal speech  No distress noted  Normal eyes   Normal hearing  Normal  hand movements. Forrestine Smoker is a 63 y.o. female diagnosed with Type 2 DM with obesity and a h/o stroke    Plan  Diabetes Mellitus Type 2  Last A1c = 5.4 in [facility]  At goal with current management  Goal A1c  < 6.5%    Reviewed Diet :   30-40 grams per meal , 3 meals per day, avoid carbs in snack choices  Include moderate proteins and good fats   Ensure adequate hydration and  electrolyte replacement    Exercise :  Recommended exercise is 5-7 days a week for 30-60 mins at least, per day OR a total of 2.5 hours per week , which ever is more feasible. Ambulate as possible     Diabetic Health Maintenance  Follow up with annual eye exams  Follow up with annual podiatry exams if needed  Reviewed sick day management of BG     Other areas of Diabetic Education reviewed:   Carbs: good carbs and bad carbs, importance of carb counting, incorporation of protein with each meal to reduce Glycemic index, importance of portions, Carb/insulin ratio   Fats: Good fats and bad fats, meal planning and supplements.  Discussed how food affects blood sugar readings.     Different diabetic medications   Managing high and low sugar readings   Rotation of sites for subcutaneous medication injection    Mixed Hyperlipidemia  No results found for: LDLDIRECT  No results found for: TRIG  Continue current med regimen  Managed per PCP    Essential Hypertension  Blood pressure controlled per patient Continue current regimen    Problem List Items Addressed This Visit     Obesity  Hypothyroidism  hyperlipidemia    Diabetes Mellitus type 2                            Continuous Blood Gluc  (FREESTYLE SARATH 14 DAY READER) ARCADIO                                  Use to monitor sugars, Disp-1 Device, R-0  Normal                                         Continuous Blood Gluc Sensor (FREESTYLE SARATH 14 DAY SENSOR) MISC                                  Use to monitor sugars, Disp-2 each, R-3  Normal                                 insulin aspart (NOVOLOG FLEXPEN) 100 UNIT/ML injection pen                                  Inject 3-4 Units into the skin 3 times daily (before meals), Disp-5 pen, R-3  Normal                         Insulin Pen Needle 32G X 4 MM MISC                                  Disp-100 each, R-3, Normal  USE WITH INSULIN 3 TIMES DAILY                 Hemoglobin A1C                                  Future, Expected: 9/15/2021, Expires: 6/17/2022, Routine, Lab Collect              Return in about 6 months (around 12/17/2021).

## 2021-06-17 NOTE — TELEPHONE ENCOUNTER
Patient would like her CGM sent to a different pharmacy other than [facility]. Please call her once the PA goes through to discuss options.

## 2021-06-18 NOTE — TELEPHONE ENCOUNTER
PA approved for [facility] sensors:    SUBHA STEPHENS Case ID: [id number] help? Call us at [phone]  Outcome  Approved today  Your PA request has been approved. Additional information will be provided in the approval communication.  (Message [phone])

## 2021-06-21 NOTE — TELEPHONE ENCOUNTER
Patient would like to use [facility] pharmacy in [facility].       [address], [facility], [address] [facility]    [address]: [phone]

## 2021-06-21 NOTE — TELEPHONE ENCOUNTER
Called patient and LM on VM to find out which pharmacy she wants to [place] reader and sensors to go to .  Also informed her they both have been approved

## 2021-06-22 NOTE — TELEPHONE ENCOUNTER
[place] and sensor Rx sent to [facility] also faxed PA approval for both to [facility]

## 2021-07-25 DIAGNOSIS — Z79.4 TYPE 2 DIABETES MELLITUS WITHOUT COMPLICATION, WITH LONG-TERM CURRENT USE OF INSULIN (HCC): ICD-10-CM

## 2021-07-25 DIAGNOSIS — E11.9 TYPE 2 DIABETES MELLITUS WITHOUT COMPLICATION, WITH LONG-TERM CURRENT USE OF INSULIN (HCC): ICD-10-CM

## 2021-07-26 RX ORDER — FLASH GLUCOSE SENSOR
KIT MISCELLANEOUS
Qty: 2 EACH | Refills: 0 | Status: SHIPPED | OUTPATIENT
Start: 2021-07-26 | End: 2021-08-23

## 2021-07-26 NOTE — TELEPHONE ENCOUNTER
Medication:   Requested Prescriptions     Pending Prescriptions Disp Refills    Continuous Blood Gluc Sensor (FREESTYLE SARATH 14 DAY SENSOR) MISC [Pharmacy Med Name: Ramona Dietz SENSOR 14D KIT] 2 each 0     Sig: USE FOR CONTINUOUS GLUCOSE MONITORING.  CHANGE SENSOR EVERY 14 DAYS       Last Filled:      Patient Phone Number: [phone] (home)     Last appt: 6/17/2021   Next appt: Visit date not found    Last Labs DM: No results found for: Blanche Gastelum

## 2021-08-21 DIAGNOSIS — Z79.4 TYPE 2 DIABETES MELLITUS WITHOUT COMPLICATION, WITH LONG-TERM CURRENT USE OF INSULIN (HCC): ICD-10-CM

## 2021-08-21 DIAGNOSIS — E11.9 TYPE 2 DIABETES MELLITUS WITHOUT COMPLICATION, WITH LONG-TERM CURRENT USE OF INSULIN (HCC): ICD-10-CM

## 2021-08-23 RX ORDER — FLASH GLUCOSE SENSOR
KIT MISCELLANEOUS
Qty: 2 EACH | Refills: 0 | Status: SHIPPED | OUTPATIENT
Start: 2021-08-23 | End: 2021-09-02

## 2021-08-23 NOTE — TELEPHONE ENCOUNTER
Medication:   Requested Prescriptions     Pending Prescriptions Disp Refills    Continuous Blood Gluc Sensor (FREESTYLE SARATH 14 DAY SENSOR) MISC [Pharmacy Med Name: Doneta Helm SENSOR 14D KIT] 2 each 0     Sig: USE FOR CONTINUOUS GLUCOSE MONITORING.  CHANGE SENSOR EVERY 14 DAYS       Last Filled:      Patient Phone Number: [phone] (home)     Last appt: 6/17/2021   Next appt: Visit date not found    Last Labs DM: No results found for: Cornelius Michaud

## 2021-09-01 DIAGNOSIS — E11.9 TYPE 2 DIABETES MELLITUS WITHOUT COMPLICATION, WITH LONG-TERM CURRENT USE OF INSULIN (HCC): ICD-10-CM

## 2021-09-01 DIAGNOSIS — Z79.4 TYPE 2 DIABETES MELLITUS WITHOUT COMPLICATION, WITH LONG-TERM CURRENT USE OF INSULIN (HCC): ICD-10-CM

## 2021-09-02 RX ORDER — FLASH GLUCOSE SENSOR
KIT MISCELLANEOUS
Qty: 2 EACH | Refills: 0 | Status: SHIPPED | OUTPATIENT
Start: 2021-09-02 | End: 2021-10-11

## 2021-09-02 RX ORDER — FLASH GLUCOSE SCANNING READER
EACH MISCELLANEOUS
Qty: 1 EACH | Refills: 0 | Status: SHIPPED | OUTPATIENT
Start: 2021-09-02

## 2021-10-10 DIAGNOSIS — Z79.4 TYPE 2 DIABETES MELLITUS WITHOUT COMPLICATION, WITH LONG-TERM CURRENT USE OF INSULIN (HCC): ICD-10-CM

## 2021-10-10 DIAGNOSIS — E11.9 TYPE 2 DIABETES MELLITUS WITHOUT COMPLICATION, WITH LONG-TERM CURRENT USE OF INSULIN (HCC): ICD-10-CM

## 2021-10-11 RX ORDER — FLASH GLUCOSE SENSOR
KIT MISCELLANEOUS
Qty: 2 EACH | Refills: 0 | Status: SHIPPED | OUTPATIENT
Start: 2021-10-11 | End: 2021-11-04

## 2021-11-03 DIAGNOSIS — Z79.4 TYPE 2 DIABETES MELLITUS WITHOUT COMPLICATION, WITH LONG-TERM CURRENT USE OF INSULIN (HCC): ICD-10-CM

## 2021-11-03 DIAGNOSIS — E11.9 TYPE 2 DIABETES MELLITUS WITHOUT COMPLICATION, WITH LONG-TERM CURRENT USE OF INSULIN (HCC): ICD-10-CM

## 2021-11-04 RX ORDER — FLASH GLUCOSE SENSOR
KIT MISCELLANEOUS
Qty: 2 EACH | Refills: 0 | Status: SHIPPED | OUTPATIENT
Start: 2021-11-04 | End: 2021-12-07

## 2021-12-06 DIAGNOSIS — E11.9 TYPE 2 DIABETES MELLITUS WITHOUT COMPLICATION, WITH LONG-TERM CURRENT USE OF INSULIN (HCC): ICD-10-CM

## 2021-12-06 DIAGNOSIS — Z79.4 TYPE 2 DIABETES MELLITUS WITHOUT COMPLICATION, WITH LONG-TERM CURRENT USE OF INSULIN (HCC): ICD-10-CM

## 2021-12-07 RX ORDER — FLASH GLUCOSE SENSOR
KIT MISCELLANEOUS
Qty: 2 EACH | Refills: 0 | Status: SHIPPED | OUTPATIENT
Start: 2021-12-07 | End: 2022-01-04

## 2022-01-04 DIAGNOSIS — Z79.4 TYPE 2 DIABETES MELLITUS WITHOUT COMPLICATION, WITH LONG-TERM CURRENT USE OF INSULIN (HCC): ICD-10-CM

## 2022-01-04 DIAGNOSIS — E11.9 TYPE 2 DIABETES MELLITUS WITHOUT COMPLICATION, WITH LONG-TERM CURRENT USE OF INSULIN (HCC): ICD-10-CM

## 2022-01-04 RX ORDER — FLASH GLUCOSE SENSOR
KIT MISCELLANEOUS
Qty: 2 EACH | Refills: 0 | Status: SHIPPED | OUTPATIENT
Start: 2022-01-04 | End: 2022-02-24

## 2022-02-23 DIAGNOSIS — E11.9 TYPE 2 DIABETES MELLITUS WITHOUT COMPLICATION, WITH LONG-TERM CURRENT USE OF INSULIN (HCC): ICD-10-CM

## 2022-02-23 DIAGNOSIS — Z79.4 TYPE 2 DIABETES MELLITUS WITHOUT COMPLICATION, WITH LONG-TERM CURRENT USE OF INSULIN (HCC): ICD-10-CM

## 2022-02-24 RX ORDER — FLASH GLUCOSE SENSOR
KIT MISCELLANEOUS
Qty: 2 EACH | Refills: 0 | Status: SHIPPED | OUTPATIENT
Start: 2022-02-24 | End: 2022-03-11 | Stop reason: SDUPTHER

## 2022-03-11 ENCOUNTER — TELEPHONE (OUTPATIENT)
Dept: ENDOCRINOLOGY | Age: 62
End: 2022-03-11

## 2022-03-11 DIAGNOSIS — Z79.4 TYPE 2 DIABETES MELLITUS WITHOUT COMPLICATION, WITH LONG-TERM CURRENT USE OF INSULIN (HCC): ICD-10-CM

## 2022-03-11 DIAGNOSIS — E11.9 TYPE 2 DIABETES MELLITUS WITHOUT COMPLICATION, WITH LONG-TERM CURRENT USE OF INSULIN (HCC): ICD-10-CM

## 2022-03-11 RX ORDER — INSULIN ASPART 100 [IU]/ML
3-4 INJECTION, SOLUTION INTRAVENOUS; SUBCUTANEOUS
Qty: 5 PEN | Refills: 0 | Status: SHIPPED | OUTPATIENT
Start: 2022-03-11

## 2022-03-11 RX ORDER — FLASH GLUCOSE SENSOR
KIT MISCELLANEOUS
Qty: 2 EACH | Refills: 0 | Status: SHIPPED | OUTPATIENT
Start: 2022-03-11

## 2022-03-11 NOTE — TELEPHONE ENCOUNTER
Patient says she was not aware that Elodia Rivera was leaving. She is in need of a refill for her insulin aspart (NOVOLOG FLEXPEN) 100 UNIT/ML injection pen         [address], [facility]. - P [phone] - F [phone]   [address] [facility] [address] [facility]   Phone:  [phone]  Fax:  [phone]      Patient also needs her Continuous Blood Gluc Sensor (FREESTYLE SARATH [facility]) [address] [facility] [address] [facility], [address] [place] [address] [address] [facility] [phone] [address] [place] [address], [address] [place] [address]   Phone:  [phone]  Fax:  [phone]      Patient currently uses two pharmacies. Patient does not currently have a PCP.

## 2022-08-09 DIAGNOSIS — E11.9 TYPE 2 DIABETES MELLITUS WITHOUT COMPLICATION, WITH LONG-TERM CURRENT USE OF INSULIN (HCC): ICD-10-CM

## 2022-08-09 DIAGNOSIS — Z79.4 TYPE 2 DIABETES MELLITUS WITHOUT COMPLICATION, WITH LONG-TERM CURRENT USE OF INSULIN (HCC): ICD-10-CM

## 2022-08-10 RX ORDER — INSULIN ASPART 100 [IU]/ML
INJECTION, SOLUTION INTRAVENOUS; SUBCUTANEOUS
Qty: 15 ML | Refills: 0 | OUTPATIENT
Start: 2022-08-10

## 2024-01-18 ENCOUNTER — OFFICE VISIT (OUTPATIENT)
Dept: ENT CLINIC | Age: 64
End: 2024-01-18
Payer: MEDICAID

## 2024-01-18 ENCOUNTER — PROCEDURE VISIT (OUTPATIENT)
Dept: AUDIOLOGY | Age: 64
End: 2024-01-18
Payer: MEDICAID

## 2024-01-18 VITALS
BODY MASS INDEX: 31.89 KG/M2 | WEIGHT: 180 LBS | SYSTOLIC BLOOD PRESSURE: 159 MMHG | DIASTOLIC BLOOD PRESSURE: 85 MMHG | HEART RATE: 82 BPM | HEIGHT: 63 IN

## 2024-01-18 DIAGNOSIS — H90.3 ASYMMETRICAL SENSORINEURAL HEARING LOSS: Primary | ICD-10-CM

## 2024-01-18 DIAGNOSIS — R42 DIZZINESS: ICD-10-CM

## 2024-01-18 DIAGNOSIS — R26.89 IMBALANCE: ICD-10-CM

## 2024-01-18 DIAGNOSIS — H91.8X3 ASYMMETRICAL HEARING LOSS: Primary | ICD-10-CM

## 2024-01-18 DIAGNOSIS — H69.93 DYSFUNCTION OF BOTH EUSTACHIAN TUBES: ICD-10-CM

## 2024-01-18 PROCEDURE — 92557 COMPREHENSIVE HEARING TEST: CPT | Performed by: AUDIOLOGIST

## 2024-01-18 PROCEDURE — 99204 OFFICE O/P NEW MOD 45 MIN: CPT | Performed by: OTOLARYNGOLOGY

## 2024-01-18 PROCEDURE — G8427 DOCREV CUR MEDS BY ELIG CLIN: HCPCS | Performed by: OTOLARYNGOLOGY

## 2024-01-18 PROCEDURE — 4004F PT TOBACCO SCREEN RCVD TLK: CPT | Performed by: OTOLARYNGOLOGY

## 2024-01-18 PROCEDURE — G8484 FLU IMMUNIZE NO ADMIN: HCPCS | Performed by: OTOLARYNGOLOGY

## 2024-01-18 PROCEDURE — 92567 TYMPANOMETRY: CPT | Performed by: AUDIOLOGIST

## 2024-01-18 PROCEDURE — G8417 CALC BMI ABV UP PARAM F/U: HCPCS | Performed by: OTOLARYNGOLOGY

## 2024-01-18 PROCEDURE — 3017F COLORECTAL CA SCREEN DOC REV: CPT | Performed by: OTOLARYNGOLOGY

## 2024-01-18 RX ORDER — DULAGLUTIDE 1.5 MG/.5ML
INJECTION, SOLUTION SUBCUTANEOUS
COMMUNITY
Start: 2024-01-12

## 2024-01-18 RX ORDER — FLUTICASONE PROPIONATE 50 MCG
SPRAY, SUSPENSION (ML) NASAL
COMMUNITY
Start: 2024-01-03

## 2024-01-18 RX ORDER — POTASSIUM CHLORIDE 20 MEQ/1
TABLET, EXTENDED RELEASE ORAL
COMMUNITY
Start: 2024-01-12

## 2024-01-18 RX ORDER — ROSUVASTATIN CALCIUM 40 MG/1
40 TABLET, COATED ORAL DAILY
COMMUNITY
Start: 2022-11-18

## 2024-01-18 RX ORDER — ERGOCALCIFEROL 1.25 MG/1
CAPSULE ORAL
COMMUNITY
Start: 2023-12-22

## 2024-01-18 RX ORDER — HYDROCHLOROTHIAZIDE 25 MG/1
25 TABLET ORAL DAILY
COMMUNITY
Start: 2023-12-22

## 2024-01-18 RX ORDER — INSULIN GLARGINE 100 [IU]/ML
INJECTION, SOLUTION SUBCUTANEOUS
COMMUNITY

## 2024-01-18 RX ORDER — FUROSEMIDE 40 MG/1
40 TABLET ORAL 2 TIMES DAILY
COMMUNITY
Start: 2023-12-05

## 2024-01-18 RX ORDER — HYDROXYZINE HYDROCHLORIDE 25 MG/1
TABLET, FILM COATED ORAL
COMMUNITY
Start: 2020-10-12

## 2024-01-18 NOTE — PROGRESS NOTES
Aliyah Summers   1960, 63 y.o. female   [id number]       Referring Provider: Shoshana Reid DO   Referral Type: In an order in Epic    Reason for Visit: Evaluation of the cause of disorders of hearing, tinnitus, or balance.    ADULT AUDIOLOGIC EVALUATION      Aliyah Summers is a 63 y.o. female seen today, 1/18/2024 , for an initial audiologic evaluation.  Patient was seen by Shoshana Reid DO  following today's evaluation.    AUDIOLOGIC AND OTHER PERTINENT MEDICAL HISTORY:      Aliyah Summers reports vertigo and dizziness concerns. She reports the use of meclizine for the last 2 months. Her dizziness has caused her to fall multiple times. She is concerned she has an ear infection in the left ear currently. She has a significant history of ear infections. Her medical history is significant for a stroke. No other significant otologic history reported.      She denied otorrhea, tinnitus, and history of significant noise exposure.    Date: 1/18/2024     IMPRESSIONS:      Today's results revealed an asymmetrical sensorineural hearing loss with the left ear being the poorer ear. Good speech understanding when in quiet. Tympanometry indicates  negative pressure, bilaterally. Discussed test results and implications with patient.  Follow medical recommendations of Shoshana Reid DO.    ASSESSMENT AND FINDINGS:     Otoscopy unremarkable.    RIGHT EAR:  Hearing Sensitivity: A mild to moderate sensorineural hearing loss.  Speech Recognition Threshold: 35 dB HL  Word Recognition: Excellent 100%, based on NU-6 25-word list at 65 dBHL using recorded speech stimuli.    Tympanometry: Negative peak pressure with normal compliance, Type C tympanogram, consistent with ETD/history of otitis media. Volume 1.4 cm3, Peak -162 daPa, 0.40 mmho.      LEFT EAR:  Hearing Sensitivity: A mild to moderately severe sensorineural hearing loss.  Speech Recognition Threshold: 45 dB HL  Word Recognition: Good 80%, based on NU-6 25-word list at 80

## 2024-01-18 NOTE — PROGRESS NOTES
[facility]  [address], [address]  [place] [address]  P: [phone]       Patient     Aliyah Summers  1960    ChiefComplaint     Chief Complaint   Patient presents with    New Patient    Dizziness    Hearing Problem    Ear Problem     Has had several ear infections.       History of Present Illness     Aliyah is a 63-year-old female here today for evaluation of room spinning vertigo and imbalance that has been ongoing for 2 months.  States she has longstanding history of this intermittently particularly since stroke several years ago.  States 2 months ago she developed significant imbalance and since then has been falling on a near daily basis.  Currently using meclizine 3 times daily for symptoms which she states is making her incredibly drowsy.  Does feel symptoms are worse with change in position particularly to the left.  No history of recent head trauma.  Unaware of asymmetry in hearing.    Past Medical History     Past Medical History:   Diagnosis Date    Asthma     CAD (coronary artery disease)     Diabetes mellitus (HCC)     Postmenopausal symptoms        Past Surgical History     Past Surgical History:   Procedure Laterality Date     SECTION      DILATION AND CURETTAGE OF UTERUS      TONSILLECTOMY         Family History     History reviewed. No pertinent family history.    Social History     Social History     Tobacco Use    Smoking status: Every Day     Current packs/day: 0.30     Average packs/day: 0.3 packs/day for 20.0 years (6.0 ttl pk-yrs)     Types: Cigarettes    Smokeless tobacco: Never   Vaping Use    Vaping Use: Never used   Substance Use Topics    Alcohol use: Yes    Drug use: No        Allergies     Allergies   Allergen Reactions    Latex      Other reaction(s): Other (See Comments)    Milk (Cow)      Other Reaction(s): swelling of lips    Ace Inhibitors     Bee Venom     Cephalexin      Other Reaction(s): rash, itching    Levofloxacin      Other

## 2024-01-19 ASSESSMENT — ENCOUNTER SYMPTOMS
SHORTNESS OF BREATH: 0
COUGH: 0
SINUS PRESSURE: 0
VOICE CHANGE: 0
FACIAL SWELLING: 0
APNEA: 0
TROUBLE SWALLOWING: 0
EYE ITCHING: 0
SORE THROAT: 0

## 2024-01-19 NOTE — PATIENT INSTRUCTIONS
Aid Evaluation\" with an audiologist to discuss your lifestyle, features of hearing aid technology, and styles of hearing aids available.  It is recommended that you contact your insurance company to determine if you have a hearing aid benefit, as this may dictate who you can see for these services.  Have hearing tests as your doctor suggests. They can show whether your hearing has changed. Your hearing aid may need to be adjusted.  Use other assistive devices as needed. These may include:  Telephone amplifiers and hearing aids that can connect to a television, stereo, radio, or microphone.  Devices that use lights or vibrations. These alert you to the doorbell, a ringing telephone, or a baby monitor.  Television closed-captioning. This shows the words at the bottom of the screen. Most new TVs can do this.  TTY (text telephone). This lets you type messages back and forth on the telephone instead of talking or listening. These devices are also called TDD. When messages are typed on the keyboard, they are sent over the phone line to a receiving TTY. The message is shown on a monitor.  Use pagers, fax machines, text, and email if it is hard for you to communicate by telephone.  Try to learn a listening technique called speech-reading. It is not lip-reading. You pay attention to people's gestures, expressions, posture, and tone of voice. These clues can help you understand what a person is saying. Face the person you are talking to, and have him or her face you. Make sure the lighting is good. You need to see the other person's face clearly.  Think about counseling if you need help to adjust to your hearing loss.    When should you call for help?  Watch closely for changes in your health, and be sure to contact your doctor if:    You think your hearing is getting worse.     You have new symptoms, such as dizziness or nausea.                Dizziness: Care Instructions  Your Care Instructions  Dizziness is the feeling of

## 2024-01-30 ENCOUNTER — TELEPHONE (OUTPATIENT)
Dept: ENT CLINIC | Age: 64
End: 2024-01-30

## 2024-01-30 NOTE — TELEPHONE ENCOUNTER
Aliyah does not feel good enough to drive to [facility] for the MRI.  Is she able to get this done at [facility]?

## 2024-01-31 NOTE — TELEPHONE ENCOUNTER
Lvm for Aliyah that she can get the MRI at [facility].  She will need to schedule at least a week out so that we can get the authorization.

## 2024-02-08 ENCOUNTER — TELEPHONE (OUTPATIENT)
Dept: ENT CLINIC | Age: 64
End: 2024-02-08

## 2024-02-08 NOTE — TELEPHONE ENCOUNTER
Patient called and wants to speak with Dr. Reid or her medical assistant. She has not been feeling well. States she has a fever due to her saliva stones. She's very concerned and doesn't know what to do.     [phone]